# Patient Record
Sex: FEMALE | Race: ASIAN
[De-identification: names, ages, dates, MRNs, and addresses within clinical notes are randomized per-mention and may not be internally consistent; named-entity substitution may affect disease eponyms.]

---

## 2019-09-03 ENCOUNTER — HOSPITAL ENCOUNTER (EMERGENCY)
Dept: HOSPITAL 72 - EMR | Age: 29
Discharge: HOME | End: 2019-09-03
Payer: COMMERCIAL

## 2019-09-03 VITALS — BODY MASS INDEX: 31.25 KG/M2 | WEIGHT: 155 LBS | HEIGHT: 59 IN

## 2019-09-03 VITALS — DIASTOLIC BLOOD PRESSURE: 81 MMHG | SYSTOLIC BLOOD PRESSURE: 137 MMHG

## 2019-09-03 VITALS — SYSTOLIC BLOOD PRESSURE: 137 MMHG | DIASTOLIC BLOOD PRESSURE: 81 MMHG

## 2019-09-03 DIAGNOSIS — F32.9: ICD-10-CM
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
## 2019-09-03 DIAGNOSIS — R25.8: Primary | ICD-10-CM

## 2019-09-03 DIAGNOSIS — F25.9: ICD-10-CM

## 2019-09-03 DIAGNOSIS — T43.595A: ICD-10-CM

## 2019-09-03 PROCEDURE — 96372 THER/PROPH/DIAG INJ SC/IM: CPT

## 2019-09-03 PROCEDURE — 99283 EMERGENCY DEPT VISIT LOW MDM: CPT

## 2019-09-03 NOTE — EMERGENCY ROOM REPORT
History of Present Illness


General


Chief Complaint:  Behavioral Complaint


Source:  Patient





Present Illness


HPI


This is a 28-year-old female with a history of bipolar.  She presents with 

chief complaint of side effect of medication.  She was taking Seroquel but had 

intolerance and weight gain from it.  Her doctor switched her to Abilify.  She 

started having some abnormal movement and shakiness.  Cogentin was started.  

According to mom things seem to be getting worse.  She seemed to be more shaky 

and mental status not as clear.  She gets confused at times.  Denies any drug 

use.  No nausea no vomiting.  No fever chills but denies any other complaint.  

No suicidal thoughts or homicidal thoughts.


Allergies:  


Coded Allergies:  


     No Known Allergies (Unverified , 7/30/19)





Patient History


Past Medical History:  see triage record, old chart reviewed, psych hx


Past Surgical History:  none


Pertinent Family History:  none


Social History:  Denies: smoking


Last Menstrual Period:  08/27/19


Pregnant Now:  No


Immunizations:  UTD


Reviewed Nursing Documentation:  PMH: Agreed; PSxH: Agreed





Nursing Documentation-PMH


Past Medical History:  No History, Except For


History Of Psychiatric Problem:  Yes - DEPRESSION, SCHIZOAFFECTIVE





Review of Systems


Eye:  Denies: eye pain, blurred vision


ENT:  Denies: ear pain, nose congestion, throat swelling


Respiratory:  Denies: cough, shortness of breath


Cardiovascular:  Denies: chest pain, palpitations


Gastrointestinal:  Denies: abdominal pain, diarrhea, nausea, vomiting


Musculoskeletal:  Denies: back pain, joint pain


Skin:  Denies: rash


Neurological:  Denies: headache, numbness


Endocrine:  Denies: increased thirst, increased urine


Hematologic/Lymphatic:  Denies: easy bruising


All Other Systems:  negative except mentioned in HPI





Physical Exam





Vital Signs








  Date Time  Temp Pulse Resp B/P (MAP) Pulse Ox O2 Delivery O2 Flow Rate FiO2


 


9/3/19 21:57 99.0 104 18 137/81 (99) 97 Room Air  





Vitals normal


Sp02 EP Interpretation:  reviewed, normal


General Appearance:  well appearing, no apparent distress, alert


Head:  normocephalic, atraumatic


Eyes:  bilateral eye PERRL, bilateral eye EOMI


ENT:  hearing grossly normal, normal pharynx


Neck:  full range of motion, supple, no meningismus


Respiratory:  chest non-tender, lungs clear, normal breath sounds


Cardiovascular #1:  regular rate, rhythm, no murmur


Gastrointestinal:  normal bowel sounds, non tender, no mass, no organomegaly, 

no bruit, non-distended


Musculoskeletal:  back normal, gait/station normal, normal range of motion


Psychiatric:  mood/affect normal





Medical Decision Making


Diagnostic Impression:  


 Primary Impression:  


 Side effect of medication


ER Course


Patient with possible side effect of medication.  We will switch her to 

Zyprexa.  No evidence of suicidal thoughts homicidal thought.  No criteria for 

5150.  Will discharge home.





Last Vital Signs








  Date Time  Temp Pulse Resp B/P (MAP) Pulse Ox O2 Delivery O2 Flow Rate FiO2


 


9/3/19 21:57 99.0 104 18 137/81 (99) 97 Room Air  








Status:  improved


Disposition:  HOME, SELF-CARE


Condition:  Stable


Scripts


Olanzapine* (ZYPREXA*) 5 Mg Tablet


5 MG ORAL DAILY, #30 TAB


   Prov: Osvaldo Sarabia MD         9/3/19





Additional Instructions:  


Stop your Cogentin and Abilify.  Take Zyprexa instead.  Keep your appointment 

with your counselor next week.  Return if symptoms worsen.











Osvaldo Sarabia MD Sep 3, 2019 22:23